# Patient Record
Sex: MALE | URBAN - METROPOLITAN AREA
[De-identification: names, ages, dates, MRNs, and addresses within clinical notes are randomized per-mention and may not be internally consistent; named-entity substitution may affect disease eponyms.]

---

## 2023-03-29 ENCOUNTER — APPOINTMENT (OUTPATIENT)
Dept: URBAN - METROPOLITAN AREA SURGERY 12 | Age: 38
Setting detail: DERMATOLOGY
End: 2023-03-29

## 2023-03-29 DIAGNOSIS — L73.2 HIDRADENITIS SUPPURATIVA: ICD-10-CM

## 2023-03-29 DIAGNOSIS — L40.8 OTHER PSORIASIS: ICD-10-CM

## 2023-03-29 DIAGNOSIS — L40.0 PSORIASIS VULGARIS: ICD-10-CM

## 2023-03-29 PROCEDURE — OTHER PRESCRIPTION: OTHER

## 2023-03-29 PROCEDURE — OTHER COUNSELING: OTHER

## 2023-03-29 PROCEDURE — 99204 OFFICE O/P NEW MOD 45 MIN: CPT

## 2023-03-29 PROCEDURE — OTHER ADDITIONAL NOTES: OTHER

## 2023-03-29 RX ORDER — SULFAMETHOXAZOLE AND TRIMETHOPRIM 800; 160 MG/1; MG/1
TABLET ORAL
Qty: 20 | Refills: 0 | Status: ERX | COMMUNITY
Start: 2023-03-29

## 2023-03-29 ASSESSMENT — LOCATION DETAILED DESCRIPTION DERM
LOCATION DETAILED: GENITALIA
LOCATION DETAILED: LEFT ANTERIOR PROXIMAL THIGH
LOCATION DETAILED: RIGHT PROXIMAL PRETIBIAL REGION
LOCATION DETAILED: LEFT PROXIMAL PRETIBIAL REGION
LOCATION DETAILED: RIGHT ANTERIOR PROXIMAL THIGH

## 2023-03-29 ASSESSMENT — LOCATION ZONE DERM
LOCATION ZONE: GENITALIA
LOCATION ZONE: LEG

## 2023-03-29 ASSESSMENT — LOCATION SIMPLE DESCRIPTION DERM
LOCATION SIMPLE: LEFT THIGH
LOCATION SIMPLE: GENITALIA
LOCATION SIMPLE: RIGHT PRETIBIAL REGION
LOCATION SIMPLE: LEFT PRETIBIAL REGION
LOCATION SIMPLE: RIGHT THIGH

## 2023-03-29 NOTE — HPI: CYST
How Severe Is Your Cyst?: moderate
Is This A New Presentation, Or A Follow-Up?: Cysts
Additional History: No previous treatment.

## 2023-03-29 NOTE — HPI: PSORIASIS (PATIENT REPORTED)
Where Is Your Psoriasis Located?: Throughout body
List Prescription Topical Steroids You Are Using (Separate Each Name With A Comma):: Clobetasol cream

## 2023-03-29 NOTE — PROCEDURE: ADDITIONAL NOTES
Detail Level: Generalized
Additional Notes: Pt in office today as consult for psoriasis. Previous dermatologist has prescribed Clobetasol and Ketoconazole with no improvement. Labs were drawn (scanned into chart) on 3/8/23 by previous dermatologist for Tremfya start up. \\n\\nDiscussed Tremfya vs Cosentyx as treatment option, pt opt for Cosentyx as treatment psoriasis/ HS dx. Cosentyx paperwork scanned into chart today. \\n\\nCont applying Clobetasol cream aa bid x 2 weeks at a time and can begin applying Zoryve ( samples provided today ) to aa prn
Render Risk Assessment In Note?: no
Additional Notes: Cont applying Clobetasol cream aa bid x 2 weeks at a time and can begin applying Zoryve ( samples provided today)
Detail Level: Simple
Additional Notes: Discussed Tremfya vs Cosentyx as treatment option, pt opt for Cosentyx for psoriasis/ HS dx. Paperwork filled out today and scanned into chart. Pt to begin Bactrim ds po bid x 10days.

## 2023-04-17 ENCOUNTER — RX ONLY (RX ONLY)
Age: 38
End: 2023-04-17

## 2023-04-17 RX ORDER — SULFAMETHOXAZOLE AND TRIMETHOPRIM 800; 160 MG/1; MG/1
TABLET ORAL
Qty: 60 | Refills: 0 | Status: ERX

## 2023-05-24 ENCOUNTER — APPOINTMENT (OUTPATIENT)
Dept: URBAN - METROPOLITAN AREA SURGERY 12 | Age: 38
Setting detail: DERMATOLOGY
End: 2023-05-24

## 2023-05-24 DIAGNOSIS — L73.2 HIDRADENITIS SUPPURATIVA: ICD-10-CM

## 2023-05-24 DIAGNOSIS — L40.8 OTHER PSORIASIS: ICD-10-CM

## 2023-05-24 DIAGNOSIS — L72.11 PILAR CYST: ICD-10-CM

## 2023-05-24 DIAGNOSIS — L40.0 PSORIASIS VULGARIS: ICD-10-CM

## 2023-05-24 PROCEDURE — OTHER ADDITIONAL NOTES: OTHER

## 2023-05-24 PROCEDURE — 99213 OFFICE O/P EST LOW 20 MIN: CPT

## 2023-05-24 PROCEDURE — OTHER COUNSELING: OTHER

## 2023-05-24 ASSESSMENT — LOCATION SIMPLE DESCRIPTION DERM
LOCATION SIMPLE: RIGHT THIGH
LOCATION SIMPLE: LEFT PRETIBIAL REGION
LOCATION SIMPLE: RIGHT PRETIBIAL REGION
LOCATION SIMPLE: GENITALIA
LOCATION SIMPLE: LEFT THIGH
LOCATION SIMPLE: SCALP

## 2023-05-24 ASSESSMENT — LOCATION DETAILED DESCRIPTION DERM
LOCATION DETAILED: RIGHT CENTRAL PARIETAL SCALP
LOCATION DETAILED: LEFT ANTERIOR PROXIMAL THIGH
LOCATION DETAILED: LEFT PROXIMAL PRETIBIAL REGION
LOCATION DETAILED: RIGHT ANTERIOR PROXIMAL THIGH
LOCATION DETAILED: GENITALIA
LOCATION DETAILED: RIGHT PROXIMAL PRETIBIAL REGION

## 2023-05-24 ASSESSMENT — LOCATION ZONE DERM
LOCATION ZONE: GENITALIA
LOCATION ZONE: LEG
LOCATION ZONE: SCALP

## 2023-05-24 NOTE — HPI: PSORIASIS (PATIENT REPORTED)
Where Is Your Psoriasis Located?: Through out body
List Prescription Topical Steroids You Tried (Separate Each Name With A Comma):: Clobetasol and Ketoconazole

## 2023-05-24 NOTE — PROCEDURE: ADDITIONAL NOTES
Detail Level: Generalized
Additional Notes: Cosentyx PA approved. Pt states receiving phone call 5/22 to set up delivery method. Cont applying Clobetasol cream aa bid x 2 weeks at a time and cont applying Zoryve to aa prn. Rtc in 12 weeks for f/u
Render Risk Assessment In Note?: no
Additional Notes: Cont applying Clobetasol cream aa bid x 2 weeks at a time and cont applying Zoryve aa prn
Detail Level: Simple
Additional Notes: Cosentyx PA approved. Pt states receiving phone call 5/22 to set up delivery method. Rtc in 12 weeks for f/u
Additional Notes: Discussed future punch exc with Lesa OJEDA pt to schedule when desired